# Patient Record
Sex: MALE | URBAN - METROPOLITAN AREA
[De-identification: names, ages, dates, MRNs, and addresses within clinical notes are randomized per-mention and may not be internally consistent; named-entity substitution may affect disease eponyms.]

---

## 2023-07-07 ENCOUNTER — OFFICE VISIT (OUTPATIENT)
Dept: URBAN - METROPOLITAN AREA CLINIC 6 | Facility: CLINIC | Age: 78
End: 2023-07-07
Payer: MEDICARE

## 2023-07-07 DIAGNOSIS — B00.52 HERPESVIRAL KERATITIS: Primary | ICD-10-CM

## 2023-07-07 PROCEDURE — 92002 INTRM OPH EXAM NEW PATIENT: CPT | Performed by: OPTOMETRIST

## 2023-07-07 RX ORDER — GANCICLOVIR 1.5 MG/G
0.15 % GEL OPHTHALMIC
Qty: 5 | Refills: 0 | Status: ACTIVE
Start: 2023-07-07

## 2023-07-07 RX ORDER — ACYCLOVIR 400 MG/1
400 MG TABLET ORAL
Qty: 20 | Refills: 0 | Status: ACTIVE
Start: 2023-07-07

## 2023-07-07 ASSESSMENT — INTRAOCULAR PRESSURE
OD: 10
OS: 14

## 2023-07-07 NOTE — IMPRESSION/PLAN
Impression: Herpesviral keratitis: B00.52. Plan: HSV Keratitis - Start Kosta Latin as this is the best medication available for this condition, however, it is understood that Kosta Latin is often expensive and/or difficult to obtain. Triflurodine/Viroptic is an alternative which may be used if unable to get Kosta Latin. Kosta Latin is to be dosed 5x per day in the affected eye and Viroptic 7-9x per day. Start acyclovir PO BID for 10 days DC Travoprost

## 2023-07-12 ENCOUNTER — OFFICE VISIT (OUTPATIENT)
Dept: URBAN - METROPOLITAN AREA CLINIC 6 | Facility: CLINIC | Age: 78
End: 2023-07-12
Payer: MEDICARE

## 2023-07-12 DIAGNOSIS — B00.52 HERPESVIRAL KERATITIS: Primary | ICD-10-CM

## 2023-07-12 PROCEDURE — 92012 INTRM OPH EXAM EST PATIENT: CPT | Performed by: OPTOMETRIST

## 2023-07-12 RX ORDER — ACYCLOVIR 400 MG/1
400 MG TABLET ORAL
Qty: 20 | Refills: 2 | Status: ACTIVE
Start: 2023-07-12

## 2023-07-12 RX ORDER — GANCICLOVIR 1.5 MG/G
0.15 % GEL OPHTHALMIC
Qty: 5 | Refills: 2 | Status: ACTIVE
Start: 2023-07-12

## 2023-07-12 ASSESSMENT — INTRAOCULAR PRESSURE
OS: 15
OD: 16

## 2023-07-12 NOTE — IMPRESSION/PLAN
Impression: Herpesviral keratitis: B00.52. Plan: Follow up visit for HSV Keratitis - Healing. Pt still denies presence of fever blisters or cold sores. Continue Zirgan QID OD and  acyclovir PO BID. Nader Willard Resolving nicely, Sent refills of RX to pharmacy.